# Patient Record
(demographics unavailable — no encounter records)

---

## 2019-06-24 NOTE — P.PRLE
RE: Demian Milligan





Dear Raudel


Demian underwent successful ablation for atrial flutter for coronary myopathy and 

sustained drug refractory atrial flutter





During the procedure, intracardiac echocardiography revealed a very small 

pericardial effusion but with a very prominent pericardial stripe almost all 

around the left ventricle





It is quite likely that in addition to alcohol consumption he may have had 

myopericarditis in the past





I will continue metoprolol and cardio myopathy medications for now and we'll try

colchicine for a few weeks





Thank you for entrusting me with the care of the patient


Warm regards


Sincerely





Wayne Llanes

## 2019-06-25 NOTE — PCN
PROCEDURE NOTE



Demian Milligan is an 59-year-old male patient of Dr. Llanes and Dr. Raudel Bagley

who has cardiomyopathy, abnormal stress test, history of alcohol use and sustained

typical atrial flutter drug refractory and symptomatic.



He was brought in for an EP study and ablation of atrial flutter.



Patient was brought to the EP lab in a fasting state. Written informed consent was

obtained prior to the procedure.  The right and left groins were prepped and draped as

per protocol; 1% lidocaine was used for local anesthesia.  Venous sheaths were placed

in the right and left femoral veins and via these diagnostic mapping ablation catheter

and intracardiac echo catheters were placed.



The patient was in sustained atrial flutter at the start of the study.  Tachycardia

cycle length 262 milliseconds, QRS 76 milliseconds and  milliseconds.



After atrial flutter was terminated, sinus cycle length 1129 milliseconds, AH 68

milliseconds, HV 38 milliseconds.



A 3D electro anatomic mapping was performed.  Intracardiac echocardiography was

performed.  Mild LV dysfunction was noted.  The isthmus was identified, 3D mapping of

the right atrium and the right atrial isthmus was performed.  Entrainment mapping was

performed.  Isthmus dependency was confirmed.  RF ablation was performed.  The

tachycardia was terminated.  The RF line was complete from the tricuspid anulus to the

IVC and a complete line of block was made.  Differential pacing was performed.

Bidirectional block was proven.  Split potentials along the line were noted.



Following that, a full EP study was performed. Sinus node recovery times of 600, 500

and 400 milliseconds were 102 5 milliseconds, 1207 and 1198 milliseconds.

Corresponding corrected sinus node recovery times within normal limits.  AV node

Wenckebach block 400 milliseconds, VA Wenckebach block 670 milliseconds. Isuprel was

started wide open.  AV node Wenckebach block 260 milliseconds.  No other arrhythmias

were induced.  No slow pathway conduction, no delta waves.  ID interval was 183

milliseconds.



Intracardiac echocardiography revealed the following:  There was a pericardial stripe

all around in the left ventricle with a very small pericardial effusion consistent with

recent old pericarditis.  The patient has not had any chest pain.



PLAN:

Conscious seen for 1-2 weeks.  Continue anticoagulation, continue cardiomyopathy

medications.  Hopefully his LV function will improve with maintenance sinus rhythm.





MMODL / IJN: [id number] / Job#: [id number]

## 2019-06-25 NOTE — P.DS
Providers


Attending physician: 


Wayne Llanes





Primary care physician: 


[facility] Course: 





Patient is doing well.  He is lying comfortably in bed.  He is walking in the 

hallways.  Asymptomatic no chest discomfort


Groins of healed well no hematoma no swelling





Normal heart sounds normal S1 normal S2 no murmurs no gallops


Breath sounds are reasonably clear scatted crackles


Abdomen soft


Extremities warm





Impression


Nonischemic cardio myopathy


History of alcohol use


Sustained atrial flutter, drug refractory status post successful ablation


Follow-up twelve-lead ECG shows normal cardiac intervals normal sinus rhythm





Intracardiac echo revealed a prominent pericardial stripe with a very small 

pericardial effusion


Patient has no chest pain





Plan


Discharge home today


New medication spironolactone 25 mg by mouth daily


Take colchicine for one to 2 weeks, 0.6 mg twice a day


Continue anticoagulation


Follow-up in the office in 1-2 weeks for a groin check





Plan - Discharge Summary


Discharge Rx Participant: No


New Discharge Prescriptions: 


New


   RX: Spironolactone 25 mg PO DAILY #90 tablet





No Action


   Rivaroxaban [Xarelto] 20 mg PO DAILY


   Metoprolol Succinate (ER) [Toprol Xl] 50 mg PO DAILY


   Levothyroxine Sodium [Synthroid] 50 mcg PO DAILY


Discharge Medication List





Levothyroxine Sodium [Synthroid] 50 mcg PO DAILY 06/20/19 [History]


Metoprolol Succinate (ER) [Toprol Xl] 50 mg PO DAILY 06/20/19 [History]


Rivaroxaban [Xarelto] 20 mg PO DAILY 06/20/19 [History]


RX: Spironolactone 25 mg PO DAILY #90 tablet 06/24/19 [Rx]








Follow up Appointment(s)/Referral(s): 


Wayne Llanes MD [STAFF PHYSICIAN] - 07/03/19 9:45 am (Follow up in the office 

with Dr. Llanes for a Site Check as scheduled for you)


Activity/Diet/Wound Care/Special Instructions: 


Post EP study - Ablation instructions





1.  Keep access sites dry for 2 days.


2.  No heavy lifting or straining for 2 days.


3.  Avoid bending the hips repeatedly for 2 days.


4.  You may go up and down stairs slowly  





Call if the following is noted





1.  Bleeding, increasing swelling or pain at the access sites.


2.  Increasing chest discomfort, especially upon taking a deep breath.


3.  Increasing shortness of breath, at rest or with exertion.


4.  Undue cough / phlegm


5.  Difficulty or pain while swallowing.


6.  Pain or change in color in the extremities.


7.  Fever, chills, rigors.


8.  Increasing headache or neurologic symptoms.


9.  Dizziness, fainting, palpitations





Continue Rivaroxaban & metoprolol


Spironolactone 25 mg by mouth daily is a new medication